# Patient Record
Sex: FEMALE | Race: WHITE | Employment: PART TIME | URBAN - METROPOLITAN AREA
[De-identification: names, ages, dates, MRNs, and addresses within clinical notes are randomized per-mention and may not be internally consistent; named-entity substitution may affect disease eponyms.]

---

## 2017-03-09 ENCOUNTER — OFFICE VISIT (OUTPATIENT)
Dept: FAMILY MEDICINE CLINIC | Facility: CLINIC | Age: 42
End: 2017-03-09

## 2017-03-09 VITALS
DIASTOLIC BLOOD PRESSURE: 70 MMHG | TEMPERATURE: 98 F | HEART RATE: 58 BPM | RESPIRATION RATE: 14 BRPM | SYSTOLIC BLOOD PRESSURE: 114 MMHG | OXYGEN SATURATION: 98 %

## 2017-03-09 DIAGNOSIS — H60.392 OTHER INFECTIVE ACUTE OTITIS EXTERNA OF LEFT EAR: Primary | ICD-10-CM

## 2017-03-09 PROCEDURE — 99213 OFFICE O/P EST LOW 20 MIN: CPT | Performed by: PHYSICIAN ASSISTANT

## 2017-03-09 RX ORDER — OFLOXACIN 3 MG/ML
5 SOLUTION AURICULAR (OTIC) 2 TIMES DAILY
Qty: 5 ML | Refills: 0 | Status: SHIPPED | OUTPATIENT
Start: 2017-03-09 | End: 2017-08-15

## 2017-03-09 RX ORDER — ALBUTEROL SULFATE 90 UG/1
AEROSOL, METERED RESPIRATORY (INHALATION) EVERY 6 HOURS PRN
COMMUNITY

## 2017-03-09 NOTE — PROGRESS NOTES
CHIEF COMPLAINT:   Patient presents with:  Ear Pain: left sided ear pain, swollen gland in neck       HPI:   Neo Torres is a 46year old female who presents to clinic today with complaints of left ear pain. Has had for a few  days.  Pain is descri /70 mmHg  Pulse 58  Temp(Src) 97.8 °F (36.6 °C) (Oral)  Resp 14  SpO2 98%  LMP 02/27/2017 (Approximate)  GENERAL: well developed, well nourished,in no apparent distress  SKIN: no rashes,no suspicious lesions  HEAD: atraumatic, normocephalic  EYES: co Tylenol/Motrin prn pain. Call or return if s/sx worsen, do not improve in 3 days, or if fever of 100.4 or greater persists for 72 hours.     Patient Instructions     External Ear Infection (Adult)    External otitis (also called “swimmer’s ear”) is an · If you feel water trapped in your ear, use ear drops right away. You can get these drops over the counter at most drugstores.  They work by removing water from the ear canal.  Follow-up care  Follow up with your health care provider in one week, or as adv

## 2017-03-27 ENCOUNTER — TELEPHONE (OUTPATIENT)
Dept: FAMILY MEDICINE CLINIC | Facility: CLINIC | Age: 42
End: 2017-03-27

## 2017-03-28 NOTE — TELEPHONE ENCOUNTER
I called mother and left message on VM stating she can bring her son in today, 3/28/17, or Thursday, 3/30/17. Clinic hours provided.

## 2017-08-15 ENCOUNTER — OFFICE VISIT (OUTPATIENT)
Dept: FAMILY MEDICINE CLINIC | Facility: CLINIC | Age: 42
End: 2017-08-15

## 2017-08-15 DIAGNOSIS — Z11.1 SCREENING EXAMINATION FOR PULMONARY TUBERCULOSIS: Primary | ICD-10-CM

## 2017-08-15 PROCEDURE — 86580 TB INTRADERMAL TEST: CPT | Performed by: PHYSICIAN ASSISTANT

## 2017-08-15 NOTE — PATIENT INSTRUCTIONS
You will need to return to clinic in 48-72 hours to have results of TB test read. Please return to clinic on 8/17/17 after 12:45 or on 8/18/18 before 12:45 to have your TB test read.     If you do not return during this time your test will need to be re

## 2017-08-15 NOTE — PROGRESS NOTES
Cuba Faye is a 38year old female who presents for TB testing. TUBERCULOSIS SCREENING QUESTIONNAIRE    · Live vaccines in the past month? no  · Any steroid medication in the past month? no  · History of BCG vaccine?     no  · If female, are

## 2017-08-17 ENCOUNTER — OFFICE VISIT (OUTPATIENT)
Dept: FAMILY MEDICINE CLINIC | Facility: CLINIC | Age: 42
End: 2017-08-17

## 2017-08-17 DIAGNOSIS — Z11.1 ENCOUNTER FOR PPD SKIN TEST READING: Primary | ICD-10-CM

## 2017-08-17 LAB — INDURATION (): 0 MM (ref 0–11)

## 2017-08-17 NOTE — PROGRESS NOTES
Recent Results (from the past 24 hour(s))  -TB INTRADERMAL TEST   Collection Time: 08/17/17 12:58 PM   Result Value Ref Range   [place]: [facility]    Date Given: 8/15/17    Date Resulted: 8/17/1/7    Date Read: 8/17/17    Site: [facility]    INDURATION (PPD) 0.0 0.0 -

## (undated) NOTE — LETTER
08/17/17    Makenna Galindo  6/9/1975    This document is to verify Cuba Faye had a TB skin test preformed and the results were: negative.     Date given: 08/15/17  Date read: 08/17/17        Please call the number listed above if you have f

## (undated) NOTE — MR AVS SNAPSHOT
[address] [place] [address]  [phone]               Thank you for choosing us for your health care visit with Jonnathan Barton PA-C. We are glad to serve you and happy to provide you with this summary of your visit.   P · You may use acetaminophen or ibuprofen to control pain, unless another medication was prescribed.  Note: If you have chronic liver or kidney disease or ever had a stomach ulcer or GI bleeding, talk to your health care provider before taking any of these m Albuterol Sulfate  (90 Base) MCG/ACT Aers   Inhale into the lungs every 6 (six) hours as needed for Wheezing. Beclomethasone Dipropionate 40 MCG/ACT Aers   Inhale into the lungs 2 (two) times daily.    Commonly known as:  QVAR           of